# Patient Record
Sex: FEMALE | Race: OTHER | HISPANIC OR LATINO | URBAN - METROPOLITAN AREA
[De-identification: names, ages, dates, MRNs, and addresses within clinical notes are randomized per-mention and may not be internally consistent; named-entity substitution may affect disease eponyms.]

---

## 2018-12-31 ENCOUNTER — EMERGENCY (EMERGENCY)
Facility: HOSPITAL | Age: 33
LOS: 1 days | Discharge: HOME | End: 2018-12-31
Attending: EMERGENCY MEDICINE
Payer: MEDICAID

## 2018-12-31 VITALS
WEIGHT: 119.93 LBS | DIASTOLIC BLOOD PRESSURE: 84 MMHG | HEART RATE: 139 BPM | OXYGEN SATURATION: 100 % | TEMPERATURE: 103 F | SYSTOLIC BLOOD PRESSURE: 122 MMHG | RESPIRATION RATE: 22 BRPM

## 2018-12-31 VITALS
DIASTOLIC BLOOD PRESSURE: 66 MMHG | SYSTOLIC BLOOD PRESSURE: 104 MMHG | RESPIRATION RATE: 18 BRPM | OXYGEN SATURATION: 100 % | HEART RATE: 123 BPM | TEMPERATURE: 101 F

## 2018-12-31 LAB
ALBUMIN SERPL ELPH-MCNC: 4.4 G/DL — SIGNIFICANT CHANGE UP (ref 3.3–5)
ALP SERPL-CCNC: 76 U/L — SIGNIFICANT CHANGE UP (ref 40–120)
ALT FLD-CCNC: 19 U/L — SIGNIFICANT CHANGE UP (ref 10–45)
ANION GAP SERPL CALC-SCNC: 15 MMOL/L — SIGNIFICANT CHANGE UP (ref 5–17)
AST SERPL-CCNC: 24 U/L — SIGNIFICANT CHANGE UP (ref 10–40)
BASOPHILS # BLD AUTO: 0 K/UL — SIGNIFICANT CHANGE UP (ref 0–0.2)
BASOPHILS NFR BLD AUTO: 0.4 % — SIGNIFICANT CHANGE UP (ref 0–2)
BILIRUB SERPL-MCNC: 0.3 MG/DL — SIGNIFICANT CHANGE UP (ref 0.2–1.2)
BUN SERPL-MCNC: 5 MG/DL — LOW (ref 7–23)
CALCIUM SERPL-MCNC: 9.2 MG/DL — SIGNIFICANT CHANGE UP (ref 8.4–10.5)
CHLORIDE SERPL-SCNC: 98 MMOL/L — SIGNIFICANT CHANGE UP (ref 96–108)
CO2 SERPL-SCNC: 22 MMOL/L — SIGNIFICANT CHANGE UP (ref 22–31)
CREAT SERPL-MCNC: 0.74 MG/DL — SIGNIFICANT CHANGE UP (ref 0.5–1.3)
EOSINOPHIL # BLD AUTO: 0.1 K/UL — SIGNIFICANT CHANGE UP (ref 0–0.5)
EOSINOPHIL NFR BLD AUTO: 1.8 % — SIGNIFICANT CHANGE UP (ref 0–6)
FLUAV H3 RNA SPEC QL NAA+PROBE: DETECTED
GLUCOSE SERPL-MCNC: 123 MG/DL — HIGH (ref 70–99)
HCT VFR BLD CALC: 43.7 % — SIGNIFICANT CHANGE UP (ref 34.5–45)
HGB BLD-MCNC: 12.3 G/DL — SIGNIFICANT CHANGE UP (ref 11.5–15.5)
LYMPHOCYTES # BLD AUTO: 1 K/UL — SIGNIFICANT CHANGE UP (ref 1–3.3)
LYMPHOCYTES # BLD AUTO: 11.9 % — LOW (ref 13–44)
MCHC RBC-ENTMCNC: 25.7 PG — LOW (ref 27–34)
MCHC RBC-ENTMCNC: 28.2 GM/DL — LOW (ref 32–36)
MCV RBC AUTO: 91.1 FL — SIGNIFICANT CHANGE UP (ref 80–100)
MONOCYTES # BLD AUTO: 0.6 K/UL — SIGNIFICANT CHANGE UP (ref 0–0.9)
MONOCYTES NFR BLD AUTO: 7.6 % — SIGNIFICANT CHANGE UP (ref 2–14)
NEUTROPHILS # BLD AUTO: 6.5 K/UL — SIGNIFICANT CHANGE UP (ref 1.8–7.4)
NEUTROPHILS NFR BLD AUTO: 78.3 % — HIGH (ref 43–77)
PLATELET # BLD AUTO: 214 K/UL — SIGNIFICANT CHANGE UP (ref 150–400)
POTASSIUM SERPL-MCNC: 3.4 MMOL/L — LOW (ref 3.5–5.3)
POTASSIUM SERPL-SCNC: 3.4 MMOL/L — LOW (ref 3.5–5.3)
PROT SERPL-MCNC: 7.9 G/DL — SIGNIFICANT CHANGE UP (ref 6–8.3)
RAPID RVP RESULT: DETECTED
RBC # BLD: 4.8 M/UL — SIGNIFICANT CHANGE UP (ref 3.8–5.2)
RBC # FLD: 12 % — SIGNIFICANT CHANGE UP (ref 10.3–14.5)
SODIUM SERPL-SCNC: 135 MMOL/L — SIGNIFICANT CHANGE UP (ref 135–145)
WBC # BLD: 8.2 K/UL — SIGNIFICANT CHANGE UP (ref 3.8–10.5)
WBC # FLD AUTO: 8.2 K/UL — SIGNIFICANT CHANGE UP (ref 3.8–10.5)

## 2018-12-31 PROCEDURE — 87633 RESP VIRUS 12-25 TARGETS: CPT

## 2018-12-31 PROCEDURE — 71046 X-RAY EXAM CHEST 2 VIEWS: CPT | Mod: 26

## 2018-12-31 PROCEDURE — 80053 COMPREHEN METABOLIC PANEL: CPT

## 2018-12-31 PROCEDURE — 99284 EMERGENCY DEPT VISIT MOD MDM: CPT | Mod: 25

## 2018-12-31 PROCEDURE — 96375 TX/PRO/DX INJ NEW DRUG ADDON: CPT

## 2018-12-31 PROCEDURE — 87581 M.PNEUMON DNA AMP PROBE: CPT

## 2018-12-31 PROCEDURE — 96365 THER/PROPH/DIAG IV INF INIT: CPT

## 2018-12-31 PROCEDURE — 85027 COMPLETE CBC AUTOMATED: CPT

## 2018-12-31 PROCEDURE — 87486 CHLMYD PNEUM DNA AMP PROBE: CPT

## 2018-12-31 PROCEDURE — 99284 EMERGENCY DEPT VISIT MOD MDM: CPT

## 2018-12-31 PROCEDURE — 71046 X-RAY EXAM CHEST 2 VIEWS: CPT

## 2018-12-31 PROCEDURE — 96361 HYDRATE IV INFUSION ADD-ON: CPT

## 2018-12-31 PROCEDURE — 87798 DETECT AGENT NOS DNA AMP: CPT

## 2018-12-31 RX ORDER — ACETAMINOPHEN 500 MG
1000 TABLET ORAL ONCE
Qty: 0 | Refills: 0 | Status: COMPLETED | OUTPATIENT
Start: 2018-12-31 | End: 2018-12-31

## 2018-12-31 RX ORDER — POTASSIUM CHLORIDE 20 MEQ
40 PACKET (EA) ORAL ONCE
Qty: 0 | Refills: 0 | Status: COMPLETED | OUTPATIENT
Start: 2018-12-31 | End: 2018-12-31

## 2018-12-31 RX ORDER — SODIUM CHLORIDE 9 MG/ML
2000 INJECTION INTRAMUSCULAR; INTRAVENOUS; SUBCUTANEOUS ONCE
Qty: 0 | Refills: 0 | Status: COMPLETED | OUTPATIENT
Start: 2018-12-31 | End: 2018-12-31

## 2018-12-31 RX ORDER — ONDANSETRON 8 MG/1
4 TABLET, FILM COATED ORAL ONCE
Qty: 0 | Refills: 0 | Status: COMPLETED | OUTPATIENT
Start: 2018-12-31 | End: 2018-12-31

## 2018-12-31 RX ADMIN — Medication 1000 MILLIGRAM(S): at 14:38

## 2018-12-31 RX ADMIN — ONDANSETRON 4 MILLIGRAM(S): 8 TABLET, FILM COATED ORAL at 13:37

## 2018-12-31 RX ADMIN — Medication 40 MILLIEQUIVALENT(S): at 14:44

## 2018-12-31 RX ADMIN — Medication 400 MILLIGRAM(S): at 13:36

## 2018-12-31 RX ADMIN — SODIUM CHLORIDE 2000 MILLILITER(S): 9 INJECTION INTRAMUSCULAR; INTRAVENOUS; SUBCUTANEOUS at 13:36

## 2018-12-31 RX ADMIN — Medication 1000 MILLIGRAM(S): at 14:00

## 2018-12-31 RX ADMIN — SODIUM CHLORIDE 2000 MILLILITER(S): 9 INJECTION INTRAMUSCULAR; INTRAVENOUS; SUBCUTANEOUS at 14:39

## 2018-12-31 NOTE — ED PROVIDER NOTE - OBJECTIVE STATEMENT
32yo F w/ no significant pmhx c/o 3 days of fever, chills, cough, SOB, body aches. Denies any sick contacts or recent travel anywhere. Pt has not been taking anything over the counter.

## 2018-12-31 NOTE — ED PROVIDER NOTE - MEDICAL DECISION MAKING DETAILS
34yo F w/ no significant pmhx c/o 3 days of fever, chills, cough, SOB, body aches. Denies any sick contacts or recent travel anywhere. Pt has not been taking anything over the counter. likely Influenza  - labs, IVF, RVP

## 2018-12-31 NOTE — ED ADULT NURSE REASSESSMENT NOTE - NS ED NURSE REASSESS COMMENT FT1
1555 /68, , temp 100.9. Pt for discharge. MD Jono Gomez made aware of pt's heart rate. He states "shes good to go".

## 2018-12-31 NOTE — ED ADULT NURSE NOTE - NSIMPLEMENTINTERV_GEN_ALL_ED
Implemented All Universal Safety Interventions:  [place] to call system. Call bell, personal items and telephone within reach. Instruct patient to call for assistance. Room bathroom lighting operational. Non-slip footwear when patient is off stretcher. Physically safe environment: no spills, clutter or unnecessary equipment. Stretcher in lowest position, wheels locked, appropriate side rails in place.

## 2018-12-31 NOTE — ED PROVIDER NOTE - NS ED MD EM SELECTION
Return for full exam.     BIOPSY SITE CARE INSTRUCTIONS     Keep the initial dressing or Band-Aid in place and DRY for 24 hours.   You may remove the dressing and shower or bathe after the first 24 hours.   Change the dressing daily using the following method:     · Wash your hands before and after changing the dressing.  · Wash the affected area gently soap and water.  · Dry the area with clean gauze or cotton balls.  · Apply double antibiotic ointment (Polysporin), Vaseline or Aquaphor to the affected area. Do not use Neosporin.   · Cover the wound with a Band-Aid.    If the wound starts to bleed, apply continuous pressure to the area for 15 minutes. (Do not check to see if the bleeding has stopped for at least 15 minutes). If you can’t get the bleeding to stop, call the clinic.     For pain or discomfort, use Tylenol or Extra Strength Tylenol as long as you are able to take Tylenol. Do not use aspirin, Anacin, Bufferin, Excedrin, Motrin or other aspirin-containing or aspirin-related products, which may cause bleeding.   If you have any problems or questions, contact the clinic during the office hours.     After hours, you may call the [place] call center at [phone].       
[id number] Detailed

## 2018-12-31 NOTE — ED PROVIDER NOTE - PROGRESS NOTE DETAILS
Pt was diagnosed with the Flu, UCG negative, was reassessed, feeling better and ready to be discharged.

## 2018-12-31 NOTE — ED ADULT NURSE NOTE - OBJECTIVE STATEMENT
Female 33 years old with no medical history came in for fever, cough, nausea, body aches for 3 days. Temp in ED is 103. Code Sepsis called. Denies vomiting, diarrhea and constipation. Denies urinary symptoms. IV lock gauge 18 placed at right AC. Comfort/safety maintained.  at bedside.

## 2018-12-31 NOTE — ED PROVIDER NOTE - NSFOLLOWUPINSTRUCTIONS_ED_ALL_ED_FT
Please take over the counter medication for fever, pain and cough, and stay hydrated  Please return to the ER for any worsening or new symptoms.

## 2019-01-02 RX ORDER — FLUCONAZOLE 150 MG/1
0 TABLET ORAL
Qty: 0 | Refills: 0 | COMMUNITY

## 2019-04-13 ENCOUNTER — EMERGENCY (EMERGENCY)
Facility: HOSPITAL | Age: 34
LOS: 1 days | Discharge: HOME | End: 2019-04-13
Attending: EMERGENCY MEDICINE
Payer: MEDICAID

## 2019-04-13 VITALS
WEIGHT: 119.93 LBS | OXYGEN SATURATION: 97 % | HEART RATE: 85 BPM | TEMPERATURE: 98 F | SYSTOLIC BLOOD PRESSURE: 121 MMHG | RESPIRATION RATE: 18 BRPM | HEIGHT: 62 IN | DIASTOLIC BLOOD PRESSURE: 83 MMHG

## 2019-04-13 PROCEDURE — 99284 EMERGENCY DEPT VISIT MOD MDM: CPT | Mod: 25

## 2019-04-14 VITALS
HEART RATE: 86 BPM | SYSTOLIC BLOOD PRESSURE: 100 MMHG | DIASTOLIC BLOOD PRESSURE: 67 MMHG | TEMPERATURE: 98 F | RESPIRATION RATE: 16 BRPM | OXYGEN SATURATION: 99 %

## 2019-04-14 DIAGNOSIS — Z98.891 HISTORY OF UTERINE SCAR FROM PREVIOUS SURGERY: Chronic | ICD-10-CM

## 2019-04-14 PROBLEM — J30.2 OTHER SEASONAL ALLERGIC RHINITIS: Chronic | Status: ACTIVE | Noted: 2018-12-31

## 2019-04-14 LAB
ALBUMIN SERPL ELPH-MCNC: 4 G/DL — SIGNIFICANT CHANGE UP (ref 3.3–5)
ALP SERPL-CCNC: 60 U/L — SIGNIFICANT CHANGE UP (ref 40–120)
ALT FLD-CCNC: 25 U/L — SIGNIFICANT CHANGE UP (ref 10–45)
ANION GAP SERPL CALC-SCNC: 14 MMOL/L — SIGNIFICANT CHANGE UP (ref 5–17)
APPEARANCE UR: CLEAR — SIGNIFICANT CHANGE UP
AST SERPL-CCNC: 26 U/L — SIGNIFICANT CHANGE UP (ref 10–40)
BACTERIA # UR AUTO: NEGATIVE — SIGNIFICANT CHANGE UP
BASOPHILS # BLD AUTO: 0 K/UL — SIGNIFICANT CHANGE UP (ref 0–0.2)
BASOPHILS NFR BLD AUTO: 0.2 % — SIGNIFICANT CHANGE UP (ref 0–2)
BILIRUB SERPL-MCNC: 0.2 MG/DL — SIGNIFICANT CHANGE UP (ref 0.2–1.2)
BILIRUB UR-MCNC: NEGATIVE — SIGNIFICANT CHANGE UP
BUN SERPL-MCNC: 8 MG/DL — SIGNIFICANT CHANGE UP (ref 7–23)
CALCIUM SERPL-MCNC: 8.6 MG/DL — SIGNIFICANT CHANGE UP (ref 8.4–10.5)
CHLORIDE SERPL-SCNC: 105 MMOL/L — SIGNIFICANT CHANGE UP (ref 96–108)
CO2 SERPL-SCNC: 19 MMOL/L — LOW (ref 22–31)
COLOR SPEC: SIGNIFICANT CHANGE UP
CREAT SERPL-MCNC: 0.69 MG/DL — SIGNIFICANT CHANGE UP (ref 0.5–1.3)
CULTURE RESULTS: SIGNIFICANT CHANGE UP
DIFF PNL FLD: ABNORMAL
EOSINOPHIL # BLD AUTO: 0.2 K/UL — SIGNIFICANT CHANGE UP (ref 0–0.5)
EOSINOPHIL NFR BLD AUTO: 4.1 % — SIGNIFICANT CHANGE UP (ref 0–6)
EPI CELLS # UR: 3 /HPF — SIGNIFICANT CHANGE UP
GLUCOSE SERPL-MCNC: 87 MG/DL — SIGNIFICANT CHANGE UP (ref 70–99)
GLUCOSE UR QL: NEGATIVE — SIGNIFICANT CHANGE UP
HCG UR QL: NEGATIVE — SIGNIFICANT CHANGE UP
HCT VFR BLD CALC: 42.4 % — SIGNIFICANT CHANGE UP (ref 34.5–45)
HGB BLD-MCNC: 14.9 G/DL — SIGNIFICANT CHANGE UP (ref 11.5–15.5)
HYALINE CASTS # UR AUTO: 2 /LPF — SIGNIFICANT CHANGE UP (ref 0–2)
KETONES UR-MCNC: NEGATIVE — SIGNIFICANT CHANGE UP
LEUKOCYTE ESTERASE UR-ACNC: NEGATIVE — SIGNIFICANT CHANGE UP
LIDOCAIN IGE QN: 24 U/L — SIGNIFICANT CHANGE UP (ref 7–60)
LYMPHOCYTES # BLD AUTO: 1.7 K/UL — SIGNIFICANT CHANGE UP (ref 1–3.3)
LYMPHOCYTES # BLD AUTO: 30.2 % — SIGNIFICANT CHANGE UP (ref 13–44)
MCHC RBC-ENTMCNC: 32.8 PG — SIGNIFICANT CHANGE UP (ref 27–34)
MCHC RBC-ENTMCNC: 35 GM/DL — SIGNIFICANT CHANGE UP (ref 32–36)
MCV RBC AUTO: 93.7 FL — SIGNIFICANT CHANGE UP (ref 80–100)
MONOCYTES # BLD AUTO: 0.6 K/UL — SIGNIFICANT CHANGE UP (ref 0–0.9)
MONOCYTES NFR BLD AUTO: 9.6 % — SIGNIFICANT CHANGE UP (ref 2–14)
NEUTROPHILS # BLD AUTO: 3.2 K/UL — SIGNIFICANT CHANGE UP (ref 1.8–7.4)
NEUTROPHILS NFR BLD AUTO: 56 % — SIGNIFICANT CHANGE UP (ref 43–77)
NITRITE UR-MCNC: NEGATIVE — SIGNIFICANT CHANGE UP
PH UR: 6.5 — SIGNIFICANT CHANGE UP (ref 5–8)
PLATELET # BLD AUTO: 231 K/UL — SIGNIFICANT CHANGE UP (ref 150–400)
POTASSIUM SERPL-MCNC: 3.2 MMOL/L — LOW (ref 3.5–5.3)
POTASSIUM SERPL-SCNC: 3.2 MMOL/L — LOW (ref 3.5–5.3)
PROT SERPL-MCNC: 7.1 G/DL — SIGNIFICANT CHANGE UP (ref 6–8.3)
PROT UR-MCNC: ABNORMAL
RBC # BLD: 4.53 M/UL — SIGNIFICANT CHANGE UP (ref 3.8–5.2)
RBC # FLD: 12 % — SIGNIFICANT CHANGE UP (ref 10.3–14.5)
RBC CASTS # UR COMP ASSIST: 2 /HPF — SIGNIFICANT CHANGE UP (ref 0–4)
SODIUM SERPL-SCNC: 138 MMOL/L — SIGNIFICANT CHANGE UP (ref 135–145)
SP GR SPEC: 1.01 — SIGNIFICANT CHANGE UP (ref 1.01–1.02)
SPECIMEN SOURCE: SIGNIFICANT CHANGE UP
UROBILINOGEN FLD QL: NEGATIVE — SIGNIFICANT CHANGE UP
WBC # BLD: 5.8 K/UL — SIGNIFICANT CHANGE UP (ref 3.8–10.5)
WBC # FLD AUTO: 5.8 K/UL — SIGNIFICANT CHANGE UP (ref 3.8–10.5)
WBC UR QL: 3 /HPF — SIGNIFICANT CHANGE UP (ref 0–5)

## 2019-04-14 PROCEDURE — 96375 TX/PRO/DX INJ NEW DRUG ADDON: CPT

## 2019-04-14 PROCEDURE — 87507 IADNA-DNA/RNA PROBE TQ 12-25: CPT

## 2019-04-14 PROCEDURE — 96374 THER/PROPH/DIAG INJ IV PUSH: CPT

## 2019-04-14 PROCEDURE — 87046 STOOL CULTR AEROBIC BACT EA: CPT

## 2019-04-14 PROCEDURE — 87045 FECES CULTURE AEROBIC BACT: CPT

## 2019-04-14 PROCEDURE — 96361 HYDRATE IV INFUSION ADD-ON: CPT

## 2019-04-14 PROCEDURE — 80053 COMPREHEN METABOLIC PANEL: CPT

## 2019-04-14 PROCEDURE — 81025 URINE PREGNANCY TEST: CPT

## 2019-04-14 PROCEDURE — 85027 COMPLETE CBC AUTOMATED: CPT

## 2019-04-14 PROCEDURE — 83690 ASSAY OF LIPASE: CPT

## 2019-04-14 PROCEDURE — 99284 EMERGENCY DEPT VISIT MOD MDM: CPT | Mod: 25

## 2019-04-14 PROCEDURE — 81001 URINALYSIS AUTO W/SCOPE: CPT

## 2019-04-14 RX ORDER — DIPHENOXYLATE HCL/ATROPINE 2.5-.025MG
1 TABLET ORAL ONCE
Qty: 0 | Refills: 0 | Status: DISCONTINUED | OUTPATIENT
Start: 2019-04-14 | End: 2019-04-14

## 2019-04-14 RX ORDER — POTASSIUM CHLORIDE 20 MEQ
40 PACKET (EA) ORAL ONCE
Qty: 0 | Refills: 0 | Status: COMPLETED | OUTPATIENT
Start: 2019-04-14 | End: 2019-04-14

## 2019-04-14 RX ORDER — FAMOTIDINE 10 MG/ML
20 INJECTION INTRAVENOUS ONCE
Qty: 0 | Refills: 0 | Status: COMPLETED | OUTPATIENT
Start: 2019-04-14 | End: 2019-04-14

## 2019-04-14 RX ORDER — ONDANSETRON 8 MG/1
4 TABLET, FILM COATED ORAL ONCE
Qty: 0 | Refills: 0 | Status: COMPLETED | OUTPATIENT
Start: 2019-04-14 | End: 2019-04-14

## 2019-04-14 RX ORDER — SODIUM CHLORIDE 9 MG/ML
1000 INJECTION INTRAMUSCULAR; INTRAVENOUS; SUBCUTANEOUS ONCE
Qty: 0 | Refills: 0 | Status: COMPLETED | OUTPATIENT
Start: 2019-04-14 | End: 2019-04-14

## 2019-04-14 RX ORDER — ACETAMINOPHEN 500 MG
650 TABLET ORAL ONCE
Qty: 0 | Refills: 0 | Status: COMPLETED | OUTPATIENT
Start: 2019-04-14 | End: 2019-04-14

## 2019-04-14 RX ADMIN — FAMOTIDINE 20 MILLIGRAM(S): 10 INJECTION INTRAVENOUS at 01:15

## 2019-04-14 RX ADMIN — SODIUM CHLORIDE 1000 MILLILITER(S): 9 INJECTION INTRAMUSCULAR; INTRAVENOUS; SUBCUTANEOUS at 02:05

## 2019-04-14 RX ADMIN — ONDANSETRON 4 MILLIGRAM(S): 8 TABLET, FILM COATED ORAL at 00:50

## 2019-04-14 RX ADMIN — Medication 1 TABLET(S): at 03:23

## 2019-04-14 RX ADMIN — SODIUM CHLORIDE 2000 MILLILITER(S): 9 INJECTION INTRAMUSCULAR; INTRAVENOUS; SUBCUTANEOUS at 00:50

## 2019-04-14 RX ADMIN — Medication 40 MILLIEQUIVALENT(S): at 02:26

## 2019-04-14 RX ADMIN — Medication 650 MILLIGRAM(S): at 02:05

## 2019-04-14 RX ADMIN — Medication 650 MILLIGRAM(S): at 00:50

## 2019-04-14 NOTE — ED PROVIDER NOTE - CLINICAL SUMMARY MEDICAL DECISION MAKING FREE TEXT BOX
34yo woman presents with n/v/d x 4 days with associated fever and chills and muscle aches. Likely viral syndrome but will send stool samples with prolonged period of profuse diarrhea, lipase due to epigastric tenderness. Will resuscitate with fluids and pain control. will check electrolytes, pregnancy, and urine as pt has some flank pain as well. Will continue to monitor and reassess.

## 2019-04-14 NOTE — ED PROVIDER NOTE - NS ED ROS FT
General: +fevers and chills  Head: +headache  Eyes: no vision change, eye pain, or discharge  ENT: no ear pain, no nasal discharge, no neck pain  CV: no chest pain, no palpitations  Resp: no SOB, no cough  GI: +N/V/D, +abdominal pain, +blood in stool  : no dysuria, no hematuria, no vaginal bleeding  MSK: + diffuse joint pain  Skin: no new rash  Neuro: no focal weakness, no change in sensation

## 2019-04-14 NOTE — ED ADULT NURSE NOTE - OBJECTIVE STATEMENT
32 yo f reporting to ED for diarrhea. No PMH. Pt reporting nausea, abdominal pain, & diarrhea ( 10 - 15 episodes per day) x 5 days. Pain in abdomen is 8/10 in RUQ. Pt reports pain increases after eating. Pt AAOx3, NAD, lungs clear bilat, abdomen soft, distended, tender in RUQ, strong peripheral pulses x 4, cap refill < 2 seconds, skin warm and dry. Pt denies headache, dizziness, chest pain, palpitations, cough, SOB, urinary symptoms, blood in the stool, hematuria, fevers, chills, weakness at this time. 20 gauge established in the RAC. Safety & comfort measures maintained. Will continue to reassess.

## 2019-04-14 NOTE — ED PROVIDER NOTE - OBJECTIVE STATEMENT
32yo woman presents with nausea and diarrhea x 4 days with associated fever 2 days ago. Pt remembers eating a salad and about an hour later she had an episode of nonbloody diarrhea. The following day, pt continued to have multiple episodes of diarrhea with subjective fevers and chills and took 2 pill of Advil. She also had nausea which decreased her appetite but she has been able to tolerate fluid. She was only able to eat a slice of bread today and continues to have nausea. She came to the ED today because she continued to have watery diarrhea, saw a streak of blood on toilet paper after she wiped, and she started to have epigastric pain. She had 3 episodes of diarrhea while she was in the ED. She also started to have generalized joint pain today. She denies recent travel, sick contacts, recent hospitalizations, or recent antibiotic use. 34yo woman presents with nausea and diarrhea x 4 days with associated fever 2 days ago. Pt remembers eating a salad and about an hour later she had an episode of nonbloody diarrhea. The following day, pt continued to have multiple episodes of diarrhea with subjective fevers and chills and took 2 pill of Advil. She also had nausea which decreased her appetite but she has been able to tolerate fluid. She was only able to eat a slice of bread today and continues to have nausea. She came to the ED today because she continued to have watery diarrhea, saw a streak of blood on toilet paper after she wiped, and she started to have epigastric pain. She had 3 episodes of diarrhea while she was in the ED. She also started to have generalized joint pain today. She denies recent travel, sick contacts, recent hospitalizations, or recent antibiotic use. She denies cough, runny nose, ear pain, eye pain or discharge.

## 2019-04-14 NOTE — ED PROVIDER NOTE - ATTENDING CONTRIBUTION TO CARE
33 yof no pmhx w 4 days of profuse watery diarrhea. associated fever 2 days ago which has resolved. states having mild diffuse cramping of abd. has had nausae but no vomting, is able to linda PO fluids at this time. no recent abx use or overseas travel. states diarrhea is severe. one episode in which she saw a streak of blood    ROS:   constitutional - no fever, no chills  eyes - no visual changes, no redness  eent - no sore throat, no nasal congestion  cvs - no chest pain, no leg swelling  resp - no shortness of breath, no cough  gi - + abdominal pain, no vomiting, + diarrhea  gu - no dysuria, no hematuria  msk - no acute back pain, no joint swelling  skin - no rashes, no jaundice  neuro - no headache, no focal weakness  psych - no acute mental health issue     Physical Exam:   constitutional - well appearing, awake and alert, oriented x3  head - no external evidence of trauma  eent - no conjunctival injection or icterus, mucous membranes moist   cvs - rrr, no murmurs, no peripheral edema  resp - breath sounds clear and equal bilat, normal respiratory effort  gi - abdomen soft and nontender, no rigidity, guarding or rebound, bowel sounds present  msk - moving all extremities spontaneously, gait is at baseline for patient  neuro - alert and oriented x3, no focal deficits, CNs 2-12 grossly intact  skin- no jaundice, warm and dry  psych - mood and affect wnl, no apparent risk to self or others     likely viral/ infectious diarreha vs colitis. stool cx pending.   pt feeling better after fluids in ed/ able to linda PO . additional verbal instructions regarding diagnosis, return precautions and follow up plan given to pt and/or family. ROSIBEL Nguyen MD

## 2019-04-14 NOTE — ED ADULT NURSE REASSESSMENT NOTE - NS ED NURSE REASSESS COMMENT FT1
Pt AAOx4, NAD, resting comfortably in bed. Pt denies headache, dizziness, chest pain, cough, SOB, abdominal pain, n/v, urinary symptoms, fevers, chills, weakness at this time. Pt verbalized understanding to follow-up with PMD. Pt verbalized understanding to take Loperamide as prescribed. Pt verbalized understanding to drink plenty of fluids & follow bland diet. Pt discharged as per MD, IV removed as per MD, pt ambulated independently out of ED.

## 2019-04-14 NOTE — ED ADULT NURSE NOTE - NS ED NURSE LEVEL OF CONSCIOUSNESS ORIENTATION
Chief Complaint   Patient presents with    Flu Like Symptoms       Pt seen in the office today for fever,chills, body aches and \"not feeling well\"  Daughter tested positive for strep. Oriented - self; Oriented - place; Oriented - time

## 2019-04-14 NOTE — ED PROVIDER NOTE - PHYSICAL EXAMINATION
General: tired-appearing young woman in no acute distress  Head: normocephalic, atraumatic  Eyes: PERRL  Mouth: moist mucous membranes, no oropharyngeal erythema  Neck: supple neck  CV: normal rate and rhythm, normal S1 and S2  Respiratory: clear to auscultation bilaterally  Abdomen: epigastric tenderness to palpation, soft, nondistended, hyperactive bowel sounds x 4 quadrants  Back: no midline tenderness to palpation, no CVAT  Neuro: alert and oriented x3, speech clear, moving all extremities spontaneously  Skin: no rash on abdomen  Extremities: no edema, peripheral pulses 2+ bilaterally

## 2019-04-14 NOTE — ED PROVIDER NOTE - PROGRESS NOTE DETAILS
Jannie Braun, resident MD: pt was able to tolerate PO. pt continues to have mild abdominal pain but stool samples were sent. will discharge pt at this time and discussed return precautions and need for outpt f/u.

## 2019-04-14 NOTE — ED PROVIDER NOTE - NSFOLLOWUPINSTRUCTIONS_ED_ALL_ED_FT
Please follow up with your primary care provider in the next few days.    You can take loperamide 2mg after loose bowel movements to a maximum 8 times a day. If you have continued diarrhea for up to 10 days, seek medical attention again.    Drink plenty of fluids to stay hydrated during your illness.  It is best to drink beverages that contain electrolytes, such as sports drinks or pedialyte. Do not drink excessive quantities at a time as this may cause you to vomit, but rather drink small quantities more frequently.    Avoid dairy while you are ill. If you are able to eat, do not eat fatty or spicy food. Make sure to wash your hands frequently as your condition may be contagious.     Return to the Emergency Department immediately if you have persistent vomiting and are unable to tolerate fluids, if your abdominal pain increases or changes location, have blood in stool with fevers, or any new and concerning symptoms or concerns.

## 2019-04-15 NOTE — ED POST DISCHARGE NOTE - DETAILS
advised on proper hydration, maintaining a bland diet and monitoring symptoms to ensure improvement. patient reports that she is already feeling better. strict return precautions given and she expressed understanding. -Saima Mcdaniel PA-C

## 2019-04-16 LAB
CULTURE RESULTS: SIGNIFICANT CHANGE UP
SPECIMEN SOURCE: SIGNIFICANT CHANGE UP

## 2020-10-14 ENCOUNTER — EMERGENCY (EMERGENCY)
Facility: HOSPITAL | Age: 35
LOS: 1 days | Discharge: HOME | End: 2020-10-14
Attending: EMERGENCY MEDICINE
Payer: MEDICAID

## 2020-10-14 VITALS
SYSTOLIC BLOOD PRESSURE: 119 MMHG | DIASTOLIC BLOOD PRESSURE: 87 MMHG | RESPIRATION RATE: 20 BRPM | HEART RATE: 87 BPM | TEMPERATURE: 99 F | OXYGEN SATURATION: 100 %

## 2020-10-14 VITALS
RESPIRATION RATE: 18 BRPM | DIASTOLIC BLOOD PRESSURE: 75 MMHG | OXYGEN SATURATION: 100 % | SYSTOLIC BLOOD PRESSURE: 114 MMHG | HEART RATE: 73 BPM | TEMPERATURE: 98 F | HEIGHT: 62 IN | WEIGHT: 130.07 LBS

## 2020-10-14 DIAGNOSIS — Z98.891 HISTORY OF UTERINE SCAR FROM PREVIOUS SURGERY: Chronic | ICD-10-CM

## 2020-10-14 PROCEDURE — 99284 EMERGENCY DEPT VISIT MOD MDM: CPT

## 2020-10-14 PROCEDURE — 96374 THER/PROPH/DIAG INJ IV PUSH: CPT

## 2020-10-14 PROCEDURE — 99284 EMERGENCY DEPT VISIT MOD MDM: CPT | Mod: 25

## 2020-10-14 RX ORDER — SODIUM CHLORIDE 9 MG/ML
1000 INJECTION INTRAMUSCULAR; INTRAVENOUS; SUBCUTANEOUS ONCE
Refills: 0 | Status: COMPLETED | OUTPATIENT
Start: 2020-10-14 | End: 2020-10-14

## 2020-10-14 RX ORDER — MECLIZINE HCL 12.5 MG
25 TABLET ORAL ONCE
Refills: 0 | Status: COMPLETED | OUTPATIENT
Start: 2020-10-14 | End: 2020-10-14

## 2020-10-14 RX ORDER — ONDANSETRON 8 MG/1
4 TABLET, FILM COATED ORAL ONCE
Refills: 0 | Status: COMPLETED | OUTPATIENT
Start: 2020-10-14 | End: 2020-10-14

## 2020-10-14 RX ORDER — METOCLOPRAMIDE HCL 10 MG
10 TABLET ORAL ONCE
Refills: 0 | Status: COMPLETED | OUTPATIENT
Start: 2020-10-14 | End: 2020-10-14

## 2020-10-14 RX ADMIN — Medication 25 MILLIGRAM(S): at 22:27

## 2020-10-14 RX ADMIN — ONDANSETRON 4 MILLIGRAM(S): 8 TABLET, FILM COATED ORAL at 22:27

## 2020-10-14 RX ADMIN — Medication 10 MILLIGRAM(S): at 23:12

## 2020-10-14 RX ADMIN — SODIUM CHLORIDE 1000 MILLILITER(S): 9 INJECTION INTRAMUSCULAR; INTRAVENOUS; SUBCUTANEOUS at 23:12

## 2020-10-14 RX ADMIN — Medication 25 MILLIGRAM(S): at 23:12

## 2020-10-14 NOTE — ED PROVIDER NOTE - CLINICAL SUMMARY MEDICAL DECISION MAKING FREE TEXT BOX
Likely BPPV from history and exam, do not suspect central etiology of symptoms, will obtain urine for pregnancy, treat with zofran/meclizine and re-evaluate.

## 2020-10-14 NOTE — ED PROVIDER NOTE - ATTENDING CONTRIBUTION TO CARE
Patient seen and evaluated with resident/NP/PA, however HPI, ROS, PE and MDM as documented authored by myself unless otherwise noted- Mark Segovia MD Patient seen and evaluated with resident/NP/PA, however HPI, ROS, PE and MDM as documented authored by myself unless otherwise noted- Mark Segovia MD  ----------------------------------------------------------------------------------------------------------      Attending Statement (ELAINE Ley MD):    HPI: 36y/o F with h/o seasonal allergies, migraine headaches, presenting with dizzyness which began upon awakening today; reports feeling of room spinning, feeling off balance; has had similar episodes before, but never lasting this long; and has seen pcp and ENT doctor for vertigo before, but not started on medication.  Mild pressure in head, but no headache, no fever, +nausea/vomiting (x2 today, a/w spinning sensation at its worst).  Dizziness worse with movement, change in position, looking around.  Describes some blurry vision, which she qualifies as feeling of difficulty focusing on objects (as they are moving).  No other vision changes, no abd pain.    Review of Systems:  -General: no fever or chills  -ENT: no congestion, no difficulty swallowing  -Pulmonary: no cough, no shortness of breath  -Cardiac: no chest pain, no palpitations  -Gastrointestinal: no abdominal pain, +nausea, +vomiting, and no diarrhea.  -Genitourinary: no blood or pain with urination  -Musculoskeletal: no back or neck pain  -Skin: no rashes  -Endocrine: No h/o diabetes or thyroid disease  -Neurologic: No focal weakness or numbness; + room-spinning sensation.    PSH/PMH as noted above    On Physical Exam:  General: well appearing, in NAD, speaking clearly in full sentences and without difficulty; cooperative with exam  HEENT: PERRL, MMM  Neck: no neck tenderness, no nuchal rigidity  Cardiac: normal s1, s2; RRR; no MGR  Lungs: CTABL  Abdomen: soft nontender/nondistended  : no bladder tenderness or distension  Skin: intact, no rash  Extremities: no peripheral edema, no gross deformities  Neuro: no gross neurologic deficits; CN II-XII     MDM: Patient seen and evaluated with resident/NP/PA, however HPI, ROS, PE and MDM as documented authored by myself unless otherwise noted- Mark Segovia MD  ----------------------------------------------------------------------------------------------------------      Attending Statement (ELAINE Ley MD):    HPI: 36y/o F with h/o seasonal allergies, migraine headaches, presenting with dizzyness which began upon awakening today; reports feeling of room spinning, feeling off balance; has had similar episodes before, but never lasting this long; and has seen pcp and ENT doctor for vertigo before, but not started on medication.  Mild pressure in head, but no headache, no fever, +nausea/vomiting (x2 today, a/w spinning sensation at its worst).  Dizziness worse with movement, change in position, looking around.  Describes some blurry vision, which she qualifies as feeling of difficulty focusing on objects (as they are moving).  No other vision changes, no abd pain.    Review of Systems:  -General: no fever or chills  -ENT: no congestion, no difficulty swallowing  -Pulmonary: no cough, no shortness of breath  -Cardiac: no chest pain, no palpitations  -Gastrointestinal: no abdominal pain, +nausea, +vomiting, and no diarrhea.  -Genitourinary: no blood or pain with urination  -Musculoskeletal: no back or neck pain  -Skin: no rashes  -Endocrine: No h/o diabetes or thyroid disease  -Neurologic: No focal weakness or numbness; + room-spinning sensation.    PSH/PMH as noted above    On Physical Exam:  General: well appearing, in NAD, speaking clearly in full sentences and without difficulty; cooperative with exam  HEENT: PERRL, MMM; normal TM b/l. Normal ext auditory canal b/l.  Neck: no neck tenderness, no nuchal rigidity  Cardiac: normal s1, s2; RRR; no MGR  Lungs: CTABL  Abdomen: soft nontender/nondistended  : no bladder tenderness or distension  Skin: intact, no rash  Extremities: no peripheral edema, no gross deformities  Neuro: no gross neurologic deficits; CN II-XII grossly intact.  Leftward horizontal nystagmus present, extinguishable. HINTS exam c/w peripheral vertigo.  Unsteady gait.  Moving all extremities equally; no gross areas of sensory loss reported.     MDM: signs/symptoms c/w peripheral vertigo / BPV; no evidence of [place] or other infectious etiology involving TM/ext auditory canal. No other focal neurologic deficits.  Trial meclizine, iv fluid and iv reglan, reassess; if improving, stable for dc with outpatient f/u.

## 2020-10-14 NOTE — ED PROVIDER NOTE - NSFOLLOWUPINSTRUCTIONS_ED_ALL_ED_FT
You were evaluated in the Emergency Department for dizziness.  You were evaluated and examined by a physician, and you were given oral and intravenous medications.      Based on your evaluation: you have benign positional vertigo (Vertigo).  This is a condition affected the inner ear or the nerve that senses    There are no signs of emergency conditions requiring admission to the hospital on today's workup.  Based on the evaluation, a presumptive diagnosis was made, however, further evaluation may be required by your primary care physician or a specialist for a more definitive diagnosis.  Therefore, please follow-up as directed or return to the Emergency Department if your symptoms change or worsen.    We recommend that you:  1. See your primary care physician within the next 72 hours for follow up.  Bring a copy of your discharge paperwork (including any test results) to your doctor.  2. See a neurologist for further evaluation.  3. Take meclizine, 1 tablet every 8 hours as needed for vertigo.        *** Return immediately if you have worsening dizziness, fever, persistent vertigo/dizziness, or any other new/concerning symptoms. ***

## 2020-10-14 NOTE — ED PROVIDER NOTE - NSFOLLOWUPCLINICS_GEN_ALL_ED_FT
[facility]  Neurology  [address] [place] [address] [address] [place] [address]  Phone: [phone]  Fax:   Follow Up Time:

## 2020-10-14 NOTE — ED ADULT NURSE NOTE - OBJECTIVE STATEMENT
35 y female presents from home with N/V HA and dizziness beginning yesterday worsening today. Patient reports to vertigo in past- multiple episodes of vomiting. Followed with ENT not currently on medications for symptoms. Reports to room spinning; unable to ambulate today. Denies chest pain, SOB, diarrhea, numbness/ tingling, fevers/ chills. A&Ox3. Skin warm dry and intact. Neuro intact- 5/5 strength and sensation. Breathing comfortably in bed- no distress. Abdomen soft nontender nondistended. Safety maintained- bed locked in lowest position.

## 2020-10-14 NOTE — ED ADULT NURSE NOTE - NSIMPLEMENTINTERV_GEN_ALL_ED
Implemented All Fall Risk Interventions:  [place] to call system. Call bell, personal items and telephone within reach. Instruct patient to call for assistance. Room bathroom lighting operational. Non-slip footwear when patient is off stretcher. Physically safe environment: no spills, clutter or unnecessary equipment. Stretcher in lowest position, wheels locked, appropriate side rails in place. Provide visual cue, wrist band, yellow gown, etc. Monitor gait and stability. Monitor for mental status changes and reorient to person, place, and time. Review medications for side effects contributing to fall risk. Reinforce activity limits and safety measures with patient and family.

## 2020-10-14 NOTE — ED PROVIDER NOTE - PHYSICAL EXAMINATION
Exam:  General: Patient well appearing, vital signs within normal limits  HEENT: airway patent with moist mucous membranes, + nystagmus  Cardiac: RRR S1/S2 with strong peripheral pulses  Respiratory: lungs clear without respiratory distress  GI: abdomen soft, non tender, non distended  Neuro: no gross neurologic deficits, CN II-XII intact, normal strength and sensation throughout  Skin: warm, well perfused  Psych: normal mood and affect Exam:  General: Patient well appearing, vital signs within normal limits  HEENT: airway patent with moist mucous membranes, + nystagmus  Cardiac: RRR S1/S2 with strong peripheral pulses  Respiratory: lungs clear without respiratory distress  GI: abdomen soft, non tender, non distended  Neuro: no gross neurologic deficits, CN II-XII intact, normal strength and sensation throughout  Skin: warm, well perfused  Psych: normal mood and affect  -------------------   PE -Geoffrey Driscoll PA-C see below:  GEN: NAD, awake, eyes open spontaneously HEENT: NCAT, MMM, Trachea midline, normal conjunctiva, perrl, leftward nystagmus. CHEST/LUNGS: Non-tachypneic, CTAB, bilateral breath sounds CARDIAC: Non-tachycardic, normal perfusion ABDOMEN: Soft, NTND, No rebound/guarding MSK: No edema, no gross deformity of extremities SKIN: No rashes, no petechiae, no vesicles NEURO: CN grossly intact, normal coordination, no focal motor or sensory deficits PSYCH: Alert, appropriate, cooperative, with capacity and insight

## 2020-10-14 NOTE — ED PROVIDER NOTE - PROGRESS NOTE DETAILS
Attending note (Av): patient reports symptoms resolved.  No nystagmus, ambulatory with steady gait.  D/w patient recommendation to f/u with neurology; offered Rx for meclizine RX, and stable for dc.

## 2020-10-14 NOTE — ED PROVIDER NOTE - OBJECTIVE STATEMENT
Patient presenting complaining of room spinning dizziness beginning this morning.  Worse with lying flat.  Associated nausea.  Has had similar episodes in past.  No home medications.  Denying associated headaches, visual changes, chest pains, shortness of breath, abdominal pains.  No other complaints. Patient presenting complaining of room spinning dizziness beginning this morning.  Worse with lying flat.  Associated nausea.  Has had similar episodes in past.  No home medications.  Denying associated headaches, visual changes, chest pains, shortness of breath, abdominal pains.  No other complaints.    ------------  Hx by -Geoffrey Driscoll PA-C see below:  35y F PMH Migraines p/w dizziness x1 day. Pt states she has been constantly dizzy for the whole day, as if the room is spinning. Had one episode months ago and followed up with ENT who dx with vertigo, but did not place on meds. Pt states similar symptoms. Pt also endorses HA, nausea, 2 episodes of vomiting today. Pt denies fever/chills, chest pain/SOB, abdominal pain, diarrhea, urinary complaints.

## 2020-10-14 NOTE — ED ADULT NURSE REASSESSMENT NOTE - NS ED NURSE REASSESS COMMENT FT1
Received report from ED RN Reji; patient A&Ox3, afebrile, VSS, 20 g IV in R AC patent and site WNL, reports that her is no longer nauseous, patient reminded to provide urine sample, patient pending disposition.

## 2020-10-14 NOTE — ED PROVIDER NOTE - PATIENT PORTAL LINK FT
You can access the FollowMyHealth Patient Portal offered by [facility] by registering at the following website: [email or web address]. By joining [employer]’s FollowMyHealth portal, you will also be able to view your health information using other applications (apps) compatible with our system.

## 2020-10-14 NOTE — ED PROVIDER NOTE - NSPTACCESSSVCSAPPTDETAILS_ED_ALL_ED_FT
Please help with appointment for recurrent benign positional vertigo; recommend follow-up with neurology within 1-2 weeks.

## 2020-10-15 RX ORDER — MECLIZINE HCL 12.5 MG
1 TABLET ORAL
Qty: 20 | Refills: 0
Start: 2020-10-15

## 2021-07-09 NOTE — ED ADULT TRIAGE NOTE - WEIGHT IN KG
Anesthesia Evaluation     Patient summary reviewed   no history of anesthetic complications:  NPO Solid Status: > 8 hours  NPO Liquid Status: > 8 hours           Airway   Mallampati: I  TM distance: >3 FB  Neck ROM: full  No difficulty expected  Dental - normal exam     Pulmonary    (-) asthma, sleep apnea, not a smoker  Cardiovascular   Exercise tolerance: good (4-7 METS)    (+) hypertension, hyperlipidemia,   (-) past MI, CAD      Neuro/Psych  (-) seizures, CVA  GI/Hepatic/Renal/Endo    (+) obesity,   renal disease ARF and stones, diabetes mellitus type 2,     Musculoskeletal     Abdominal    Substance History      OB/GYN          Other                          Anesthesia Plan    ASA 2     general     intravenous induction     Anesthetic plan, all risks, benefits, and alternatives have been provided, discussed and informed consent has been obtained with: patient.    Plan discussed with CRNA.      
54.4

## 2023-10-03 NOTE — ED PROVIDER NOTE - NSDCPRINTRESULTS_ED_ALL_ED
Patient requests all Lab and Radiology Results on their Discharge Instructions Length To Time In Minutes Device Was In Place: 10
